# Patient Record
Sex: MALE | Race: WHITE | Employment: FULL TIME | URBAN - METROPOLITAN AREA
[De-identification: names, ages, dates, MRNs, and addresses within clinical notes are randomized per-mention and may not be internally consistent; named-entity substitution may affect disease eponyms.]

---

## 2017-03-09 ENCOUNTER — OFFICE VISIT (OUTPATIENT)
Dept: FAMILY MEDICINE | Facility: CLINIC | Age: 42
End: 2017-03-09
Payer: COMMERCIAL

## 2017-03-09 ENCOUNTER — RADIANT APPOINTMENT (OUTPATIENT)
Dept: GENERAL RADIOLOGY | Facility: CLINIC | Age: 42
End: 2017-03-09
Attending: NURSE PRACTITIONER
Payer: COMMERCIAL

## 2017-03-09 VITALS
BODY MASS INDEX: 27.36 KG/M2 | DIASTOLIC BLOOD PRESSURE: 100 MMHG | HEART RATE: 118 BPM | WEIGHT: 202 LBS | TEMPERATURE: 97.6 F | SYSTOLIC BLOOD PRESSURE: 142 MMHG | OXYGEN SATURATION: 98 % | HEIGHT: 72 IN

## 2017-03-09 DIAGNOSIS — J20.9 ACUTE BRONCHITIS, UNSPECIFIED ORGANISM: ICD-10-CM

## 2017-03-09 DIAGNOSIS — E10.3299 TYPE 1 DIABETES MELLITUS WITH MILD NONPROLIFERATIVE RETINOPATHY WITHOUT MACULAR EDEMA, UNSPECIFIED LATERALITY (H): Primary | ICD-10-CM

## 2017-03-09 LAB
ALBUMIN UR-MCNC: >=300 MG/DL
ANION GAP SERPL CALCULATED.3IONS-SCNC: 6 MMOL/L (ref 3–14)
APPEARANCE UR: CLEAR
BILIRUB UR QL STRIP: NEGATIVE
BUN SERPL-MCNC: 19 MG/DL (ref 7–30)
CALCIUM SERPL-MCNC: 8.9 MG/DL (ref 8.5–10.1)
CHLORIDE SERPL-SCNC: 101 MMOL/L (ref 94–109)
CO2 SERPL-SCNC: 29 MMOL/L (ref 20–32)
COLOR UR AUTO: YELLOW
CREAT SERPL-MCNC: 1.17 MG/DL (ref 0.66–1.25)
FEF 25/75: NORMAL
FEV-1: NORMAL
FEV1/FVC: NORMAL
FVC: NORMAL
GFR SERPL CREATININE-BSD FRML MDRD: 68 ML/MIN/1.7M2
GLUCOSE SERPL-MCNC: 289 MG/DL (ref 70–99)
GLUCOSE UR STRIP-MCNC: 500 MG/DL
HGB UR QL STRIP: ABNORMAL
HYALINE CASTS #/AREA URNS LPF: NORMAL /LPF (ref 0–2)
KETONES UR STRIP-MCNC: NEGATIVE MG/DL
LEUKOCYTE ESTERASE UR QL STRIP: NEGATIVE
NITRATE UR QL: NEGATIVE
PH UR STRIP: 6 PH (ref 5–7)
POTASSIUM SERPL-SCNC: 4.2 MMOL/L (ref 3.4–5.3)
RBC #/AREA URNS AUTO: NORMAL /HPF (ref 0–2)
SODIUM SERPL-SCNC: 136 MMOL/L (ref 133–144)
SP GR UR STRIP: 1.02 (ref 1–1.03)
URN SPEC COLLECT METH UR: ABNORMAL
UROBILINOGEN UR STRIP-ACNC: 0.2 EU/DL (ref 0.2–1)
WBC #/AREA URNS AUTO: NORMAL /HPF (ref 0–2)

## 2017-03-09 PROCEDURE — 94640 AIRWAY INHALATION TREATMENT: CPT | Performed by: NURSE PRACTITIONER

## 2017-03-09 PROCEDURE — 81001 URINALYSIS AUTO W/SCOPE: CPT | Performed by: NURSE PRACTITIONER

## 2017-03-09 PROCEDURE — 99214 OFFICE O/P EST MOD 30 MIN: CPT | Mod: 25 | Performed by: NURSE PRACTITIONER

## 2017-03-09 PROCEDURE — 71020 XR CHEST 2 VW: CPT

## 2017-03-09 PROCEDURE — 36415 COLL VENOUS BLD VENIPUNCTURE: CPT | Performed by: NURSE PRACTITIONER

## 2017-03-09 PROCEDURE — 80048 BASIC METABOLIC PNL TOTAL CA: CPT | Performed by: NURSE PRACTITIONER

## 2017-03-09 RX ORDER — ALBUTEROL SULFATE 90 UG/1
2 AEROSOL, METERED RESPIRATORY (INHALATION) EVERY 6 HOURS PRN
Qty: 1 INHALER | Refills: 0 | Status: SHIPPED | OUTPATIENT
Start: 2017-03-09

## 2017-03-09 RX ORDER — AZITHROMYCIN 250 MG/1
TABLET, FILM COATED ORAL
Qty: 6 TABLET | Refills: 0 | Status: SHIPPED | OUTPATIENT
Start: 2017-03-09 | End: 2018-12-17

## 2017-03-09 NOTE — MR AVS SNAPSHOT
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  Normal or non-critical lab and imaging results will be communicated to you by [employer], letter or phone within 4 business days after the clinic has received the results. If you do not hear from us within 7 days, please contact the clinic through [employer] or phone. If you have a critical or abnormal lab result, we will notify you by phone as soon as possible.  Submit refill requests through [employer] or call your pharmacy and they will forward the refill request to us. Please allow 3 business days for your refill to be completed.          Additional Information About Your Visit        [employer] Information     [employer] lets you send messages to your doctor, view your test results, renew your prescriptions, schedule appointments and more. To sign up, go to [email or web address] . Click on \"Log in\" on the left side of the screen, which will take you to the Welcome page. Then click on \"Sign up Now\" on the right side of the page.     You will be asked to enter the access code listed below, as well as some personal information. Please follow the directions to create your username and password.     Your access code " is: [email or web address]  Expires: 2017 11:52 AM     Your access code will  in 90 days. If you need help or a new code, please call your [facility] clinic or [phone].        Care EveryWhere ID     This is your Care EveryWhere ID. This could be used by other organizations to access your [facility] medical records  [id number]        Your Vitals Were     Pulse Temperature Height Pulse Oximetry BMI (Body Mass Index)       118 97.6  F (36.4  C) 6' (1.829 m) 98% 27.4 kg/m2        Blood Pressure from Last 3 Encounters:   17 (!) 142/100   16 135/73   14 102/60    Weight from Last 3 Encounters:   17 202 lb (91.6 kg)   16 202 lb (91.6 kg)   14 214 lb (97.1 kg)              We Performed the Following     *UA reflex to Microscopic and Culture ([facility] (except [place] and [place])     ALBUTEROL/IPRATROPIUM 3ML NEB     Basic metabolic panel  (Ca, Cl, CO2, Creat, Gluc, K, Na, BUN)     INHALATION/NEBULIZER TREATMENT, INITIAL     Urine Microscopic          Today's Medication Changes          These changes are accurate as of: 3/9/17 11:55 AM.  If you have any questions, ask your nurse or doctor.               Start taking these medicines.        Dose/Directions    albuterol 108 (90 BASE) MCG/ACT Inhaler   Commonly known as:  PROAIR HFA/PROVENTIL HFA/VENTOLIN HFA   Used for:  Acute bronchitis, unspecified organism   Started by:  Latisha Langford APRN CNP        Dose:  2 puff   Inhale 2 puffs into the lungs every 6 hours as needed for shortness of breath / dyspnea or wheezing   Quantity:  1 Inhaler   Refills:  0         These medicines have changed or have updated prescriptions.        Dose/Directions    * azithromycin 250 MG tablet   Commonly known as:  ZITHROMAX   This may have changed:  Another medication with the same name was added. Make sure you understand how and when to take each.   Used for:  Pneumonia due to infectious organism, unspecified  laterality, unspecified part of lung   Changed by:  Jessenia Duong APRN CNP        Two tablets first day, then one tablet daily for four days.   Quantity:  6 tablet   Refills:  0       * azithromycin 250 MG tablet   Commonly known as:  ZITHROMAX   This may have changed:  You were already taking a medication with the same name, and this prescription was added. Make sure you understand how and when to take each.   Used for:  Acute bronchitis, unspecified organism   Changed by:  Latisha Langford APRN CNP        Two tablets first day, then one tablet daily for four days.   Quantity:  6 tablet   Refills:  0       * Notice:  This list has 2 medication(s) that are the same as other medications prescribed for you. Read the directions carefully, and ask your doctor or other care provider to review them with you.         Where to get your medicines      These medications were sent to [facility] [id number] [place] [address] [address] AT [facility] [address] [facility] [address]     Phone:  [phone]     albuterol 108 (90 BASE) MCG/ACT Inhaler    azithromycin 250 MG tablet                Primary Care Provider Office Phone # Fax #    Paulo Alfred [phone]       [facility] [address] [facility] [address]  [facility] [address]        Thank you!     Thank you for choosing [facility]  for your care. Our goal is always to provide you with excellent care. Hearing back from our patients is one way we can continue to improve our services. Please take a few minutes to complete the written survey that you may receive in the mail after your visit with us. Thank you!             Your Updated Medication List - Protect others around you: Learn how to safely use, store and throw away your medicines at www.disposemymeds.org.          This list is accurate as of: 3/9/17 11:55 AM.  Always use your most recent med list.                   Brand Name  Dispense Instructions for use    albuterol 108 (90 BASE) MCG/ACT Inhaler    PROAIR HFA/PROVENTIL HFA/VENTOLIN HFA    1 Inhaler    Inhale 2 puffs into the lungs every 6 hours as needed for shortness of breath / dyspnea or wheezing       atorvastatin 20 MG tablet    LIPITOR     TK 1 T PO ONCE D       * azithromycin 250 MG tablet    ZITHROMAX    6 tablet    Two tablets first day, then one tablet daily for four days.       * azithromycin 250 MG tablet    ZITHROMAX    6 tablet    Two tablets first day, then one tablet daily for four days.       doxycycline 50 MG capsule    VIBRAMYCIN     TK 1 C PO BID       insulin aspart 100 UNITS/ML injection    NovoLOG     Inject Subcutaneous See Admin Instructions 3 units with each meal plus 1 unit for every 150 over 180 ~ 30 units per day.       insulin glargine 100 UNIT/ML injection    LANTUS     Inject 40 Units Subcutaneous At Bedtime.       lisinopril 10 MG tablet    PRINIVIL/ZESTRIL     TK TWO TS PO D       SB LOW DOSE ASA EC 81 MG EC tablet   Generic drug:  aspirin      Take 81 mg by mouth daily.       * Notice:  This list has 2 medication(s) that are the same as other medications prescribed for you. Read the directions carefully, and ask your doctor or other care provider to review them with you.

## 2017-03-09 NOTE — LETTER
[facility]  [address]  [place] [address]  [phone]                                                                                               Date: 3/9/2017    Harlan Barrientos                                                                               [address] [facility] [address]              Dear Harlan,    The radiologist read xray as negative for pneumonia also  Enclosed is a copy of your results.      It was a pleasure to see you at your last appointment. If you have any questions, please feel free to call myself or my nurse at [phone].          Sincerely,    Latisha Langford NP/ Angelina MARQUIS, CMA  Results for orders placed or performed in visit on 03/09/17   XR Chest 2 Views    Narrative    XR CHEST 2 VW 3/9/2017 11:35 AM    COMPARISON: 9/21/2016    HISTORY: Acute bronchitis.      Impression    IMPRESSION: Cardiac silhouette and pulmonary vasculature are within  normal limits. No focal airspace disease, pleural effusion or  pneumothorax.    ALISIA OVIEDO

## 2017-03-09 NOTE — PROGRESS NOTES
SUBJECTIVE:                                                    Harlan Barrientos is a 41 year old male who presents to clinic today for the following health issues:      RESPIRATORY SYMPTOMS      Duration: started last Thursday with URI -coughing , wheezing, chest congestion    Description  cough, wheezing, fever, headache and fatigue/malaise    Severity: severe    Accompanying signs and symptoms: None    History (predisposing factors):  none    Precipitating or alleviating factors: child has pneumonia and so does wife    Therapies tried and outcome:  nyquil    Type 1 DM  Did not take his insulin this morning, did drink orange juice, feeling dehydrated     Members of household sick and has been on plane recently, did have flu vaccine    Problem list and histories reviewed & adjusted, as indicated.  Additional history: as documented    Patient Active Problem List   Diagnosis     ED (erectile dysfunction)     Hyperlipidemia     Microalbuminuria     HYPERLIPIDEMIA LDL GOAL <100     Type 1 diabetes, HbA1c goal < 8% (H)     Type 1 diabetes mellitus with mild nonproliferative diabetic retinopathy and without macular edema (H)     Past Surgical History   Procedure Laterality Date     C appendectomy       C anesth,dx arthrosc proc,shoulder joint  2008       Social History   Substance Use Topics     Smoking status: Former Smoker     Types: Cigarettes     Quit date: 1/1/2004     Smokeless tobacco: Never Used     Alcohol use Yes      Comment: 2-3 weekly     Family History   Problem Relation Age of Onset     OSTEOPOROSIS Mother      CANCER Maternal Grandmother      Brain Tumor     Neurologic Disorder Maternal Grandfather      ALS         Current Outpatient Prescriptions   Medication Sig Dispense Refill     doxycycline (VIBRAMYCIN) 50 MG capsule TK 1 C PO BID  0     lisinopril (PRINIVIL,ZESTRIL) 10 MG tablet TK TWO TS PO D  2     atorvastatin (LIPITOR) 20 MG tablet TK 1 T PO ONCE D  1     aspirin (SB LOW DOSE ASA EC) 81 MG EC  tablet Take 81 mg by mouth daily.        INSULIN ASPART 100 UNIT/ML SC SOLN Inject Subcutaneous See Admin Instructions 3 units with each meal plus 1 unit for every 150 over 180 ~ 30 units per day.        insulin glargine (LANTUS) SOLN 100 UNIT/ML Inject 40 Units Subcutaneous At Bedtime.        azithromycin (ZITHROMAX) 250 MG tablet Two tablets first day, then one tablet daily for four days. (Patient not taking: Reported on 3/9/2017) 6 tablet 0     Allergies   Allergen Reactions     Penicillins        Reviewed and updated as needed this visit by clinical staff  Tobacco  Allergies  Meds  Soc Hx      Reviewed and updated as needed this visit by Provider         ROS:  Constitutional, HEENT, cardiovascular, pulmonary, gi and gu systems are negative, except as otherwise noted.    OBJECTIVE:                                                    BP (!) 142/100  Pulse 118  Temp 97.6  F (36.4  C)  Ht 6' (1.829 m)  Wt 202 lb (91.6 kg)  SpO2 98%  BMI 27.4 kg/m2  Body mass index is 27.4 kg/(m^2).  GENERAL: healthy, alert and no distress  EYES: Eyes grossly normal to inspection, PERRL and conjunctivae and sclerae normal  HENT: ear canals and TM's normal, nose and mouth without ulcers or lesions  NECK: no adenopathy, no asymmetry, masses, or scars and thyroid normal to palpation  RESP: lungs clear to auscultation - no rales, rhonchi or wheezes  CV: regular rate and rhythm, normal S1 S2, no S3 or S4, no murmur, click or rub, no peripheral edema and peripheral pulses strong  Results for orders placed or performed in visit on 03/09/17 (from the past 24 hour(s))   Basic metabolic panel  (Ca, Cl, CO2, Creat, Gluc, K, Na, BUN)   Result Value Ref Range    Sodium 136 133 - 144 mmol/L    Potassium 4.2 3.4 - 5.3 mmol/L    Chloride 101 94 - 109 mmol/L    Carbon Dioxide 29 20 - 32 mmol/L    Anion Gap 6 3 - 14 mmol/L    Glucose 289 (H) 70 - 99 mg/dL    Urea Nitrogen 19 7 - 30 mg/dL    Creatinine 1.17 0.66 - 1.25 mg/dL    GFR Estimate 68 >60  mL/min/1.7m2    GFR Estimate If Black 82 >60 mL/min/1.7m2    Calcium 8.9 8.5 - 10.1 mg/dL   *UA reflex to Microscopic and Culture ([facility] [place] (except [place] and [place])   Result Value Ref Range    Color Urine Yellow     Appearance Urine Clear     Glucose Urine 500 (A) NEG mg/dL    Bilirubin Urine Negative NEG    Ketones Urine Negative NEG mg/dL    Specific Gravity Urine 1.020 1.003 - 1.035    Blood Urine Trace (A) NEG    pH Urine 6.0 5.0 - 7.0 pH    Protein Albumin Urine >=300 (A) NEG mg/dL    Urobilinogen Urine 0.2 0.2 - 1.0 EU/dL    Nitrite Urine Negative NEG    Leukocyte Esterase Urine Negative NEG    Source Midstream Urine    Urine Microscopic   Result Value Ref Range    WBC Urine O - 2 0 - 2 /HPF    RBC Urine O - 2 0 - 2 /HPF    Hyaline Casts O - 2 0 - 2 /LPF   INHALATION/NEBULIZER TREATMENT, INITIAL   Result Value Ref Range    FVC      FEV-1      FEV1/FVC      FEF 25/75       Diagnostic Test Results: chest xray negative       ASSESSMENT/PLAN:                                                          ICD-10-CM    1. 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 Acute bronchitis, unspecified organism J20.9 XR Chest 2 Views     INHALATION/NEBULIZER TREATMENT, INITIAL     ALBUTEROL/IPRATROPIUM 3ML NEB     azithromycin (ZITHROMAX) 250 MG tablet     albuterol (PROAIR HFA/PROVENTIL HFA/VENTOLIN HFA) 108 (90 BASE) MCG/ACT Inhaler       Patient Instructions   Push fluids  Begin plain mucinex 1200mg twice a day  Monitor glucoses  Use the inhaler 2 puffs every 6 hours  Please take insulin when home  Begin increasing fluids  RTC prn     Latisha Langford, APRN [facility]

## 2017-03-09 NOTE — LETTER
[facility]  [address]  [place] [address]  [phone]                                                                                               Date: 3/9/2017    Harlan Barrientos                                                                               [address] [address]              Dear Harlan,    These are today's lab results, Harlan  Enclosed is a copy of your results.      It was a pleasure to see you at your last appointment. If you have any questions, please feel free to call myself or my nurse at [phone].          Sincerely,    Latisha Langford, MELISSA/ Angelina MARQUIS, CMA  Results for orders placed or performed in visit on 03/09/17   INHALATION/NEBULIZER TREATMENT, INITIAL   Result Value Ref Range    FVC      FEV-1      FEV1/FVC      FEF 25/75     Basic metabolic panel  (Ca, Cl, CO2, Creat, Gluc, K, Na, BUN)   Result Value Ref Range    Sodium 136 133 - 144 mmol/L    Potassium 4.2 3.4 - 5.3 mmol/L    Chloride 101 94 - 109 mmol/L    Carbon Dioxide 29 20 - 32 mmol/L    Anion Gap 6 3 - 14 mmol/L    Glucose 289 (H) 70 - 99 mg/dL    Urea Nitrogen 19 7 - 30 mg/dL    Creatinine 1.17 0.66 - 1.25 mg/dL    GFR Estimate 68 >60 mL/min/1.7m2    GFR Estimate If Black 82 >60 mL/min/1.7m2    Calcium 8.9 8.5 - 10.1 mg/dL   *UA reflex to Microscopic and Culture ([facility] (except [place] and [place])   Result Value Ref Range    Color Urine Yellow     Appearance Urine Clear     Glucose Urine 500 (A) NEG mg/dL    Bilirubin Urine Negative NEG    Ketones Urine Negative NEG mg/dL    Specific Gravity Urine 1.020 1.003 - 1.035    Blood Urine Trace (A) NEG    pH Urine 6.0 5.0 - 7.0 pH    Protein Albumin Urine >=300 (A) NEG mg/dL    Urobilinogen Urine 0.2 0.2 - 1.0 EU/dL    Nitrite Urine Negative NEG    Leukocyte Esterase Urine Negative NEG    Source Midstream Urine    Urine Microscopic   Result Value Ref Range    WBC Urine O - 2 0 - 2 /HPF    RBC  Urine O - 2 0 - 2 /HPF    Hyaline Casts O - 2 0 - 2 /LPF   '

## 2017-03-09 NOTE — PATIENT INSTRUCTIONS
Push fluids  Begin plain mucinex 1200mg twice a day  Monitor glucoses  Use the inhaler 2 puffs every 6 hours

## 2018-12-17 ENCOUNTER — OFFICE VISIT (OUTPATIENT)
Dept: FAMILY MEDICINE | Facility: CLINIC | Age: 43
End: 2018-12-17
Payer: COMMERCIAL

## 2018-12-17 VITALS
WEIGHT: 219 LBS | TEMPERATURE: 97.3 F | OXYGEN SATURATION: 98 % | DIASTOLIC BLOOD PRESSURE: 92 MMHG | HEIGHT: 72 IN | SYSTOLIC BLOOD PRESSURE: 153 MMHG | HEART RATE: 97 BPM | BODY MASS INDEX: 29.66 KG/M2

## 2018-12-17 DIAGNOSIS — J06.9 UPPER RESPIRATORY TRACT INFECTION, UNSPECIFIED TYPE: Primary | ICD-10-CM

## 2018-12-17 PROCEDURE — 99213 OFFICE O/P EST LOW 20 MIN: CPT | Performed by: INTERNAL MEDICINE

## 2018-12-17 RX ORDER — AZITHROMYCIN 250 MG/1
TABLET, FILM COATED ORAL
Qty: 6 TABLET | Refills: 1 | Status: SHIPPED | OUTPATIENT
Start: 2018-12-17 | End: 2018-12-17

## 2018-12-17 RX ORDER — AZITHROMYCIN 250 MG/1
TABLET, FILM COATED ORAL
Qty: 6 TABLET | Refills: 0 | Status: SHIPPED | OUTPATIENT
Start: 2018-12-17

## 2018-12-17 RX ORDER — CODEINE PHOSPHATE AND GUAIFENESIN 10; 100 MG/5ML; MG/5ML
1 SOLUTION ORAL 2 TIMES DAILY PRN
Qty: 120 ML | Refills: 0 | Status: SHIPPED | OUTPATIENT
Start: 2018-12-17

## 2018-12-17 ASSESSMENT — MIFFLIN-ST. JEOR: SCORE: 1916.38

## 2018-12-17 NOTE — PROGRESS NOTES
SUBJECTIVE:   Harlan Barrientos is a 43 year old male who presents to clinic today for the following health issues:      RESPIRATORY SYMPTOMS      Duration: 3 weeks    Description  nasal congestion, facial pain/pressure, cough, wheezing, headache, fatigue/malaise, hoarse voice, myalgias and conjunctival irritation    Severity: moderate    Accompanying signs and symptoms: None    History (predisposing factors):  none    Precipitating or alleviating factors: None    Therapies tried and outcome:  rest and fluids      Current Medications:     Current Outpatient Medications   Medication Sig Dispense Refill     albuterol (PROAIR HFA/PROVENTIL HFA/VENTOLIN HFA) 108 (90 BASE) MCG/ACT Inhaler Inhale 2 puffs into the lungs every 6 hours as needed for shortness of breath / dyspnea or wheezing 1 Inhaler 0     aspirin (SB LOW DOSE ASA EC) 81 MG EC tablet Take 81 mg by mouth daily.        atorvastatin (LIPITOR) 20 MG tablet TK 1 T PO ONCE D  1     azithromycin (ZITHROMAX) 250 MG tablet Two tablets first day, then one tablet daily for four days. 6 tablet 0     guaiFENesin-codeine (ROBITUSSIN AC) 100-10 MG/5ML solution Take 5 mLs by mouth 2 times daily as needed for cough 120 mL 0     INSULIN ASPART 100 UNIT/ML SC SOLN Inject Subcutaneous See Admin Instructions 3 units with each meal plus 1 unit for every 150 over 180 ~ 30 units per day.        insulin glargine (LANTUS) SOLN 100 UNIT/ML Inject 40 Units Subcutaneous At Bedtime.        lisinopril (PRINIVIL,ZESTRIL) 10 MG tablet TK TWO TS PO D  2         Allergies:      Allergies   Allergen Reactions     Penicillins             Past Medical History:     Past Medical History:   Diagnosis Date     Acne     scalp     DM type 1 (diabetes mellitus, type 1) (H)     Since age 1 - Dr. Alejandra     ED (erectile dysfunction)      Hyperlipidaemia      Iron (Fe) deficiency anemia     Resolved. Negative GI workup at [facility].     Microalbuminuria      Retinopathies, diabetic     laser surgery          Past Surgical History:     Past Surgical History:   Procedure Laterality Date     C ANESTH,DX ARTHROSC PROC,SHOULDER JOINT  2008     C APPENDECTOMY           Family Medical History:     Family History   Problem Relation Age of Onset     Osteoporosis Mother      Cancer Maternal Grandmother         Brain Tumor     Neurologic Disorder Maternal Grandfather         ALS         Social History:     Social History     Socioeconomic History     Marital status: Single     Spouse name: Not on file     Number of children: 0     Years of education: Not on file     Highest education level: Not on file   Social Needs     Financial resource strain: Not on file     Food insecurity - worry: Not on file     Food insecurity - inability: Not on file     Transportation needs - medical: Not on file     Transportation needs - non-medical: Not on file   Occupational History     Occupation:    Tobacco Use     Smoking status: Former Smoker     Types: Cigarettes     Last attempt to quit: 2004     Years since quittin.9     Smokeless tobacco: Never Used   Substance and Sexual Activity     Alcohol use: Yes     Comment: 2-3 weekly     Drug use: Yes     Comment: marijuanna in the past not recently     Sexual activity: Yes     Partners: Female   Other Topics Concern     Not on file   Social History Narrative    , 1 child     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           Review of System:     Constitutional: Negative for fever or chills  Skin: Negative for rashes  Ears/Nose/Throat: Positive for nasal congestion, sore throat  Respiratory: Positive for cough  Cardiovascular: Negative for chest pain  Gastrointestinal: Negative for nausea, vomiting  Genitourinary: Negative for dysuria, hematuria  Musculoskeletal: Negative for myalgias  Neurologic: Negative for headaches  Psychiatric: Negative for depression, anxiety  Hematologic/Lymphatic/Immunologic: Negative  Endocrine: Negative  Behavioral: Negative for tobacco  use       Physical Exam:   BP (!) 153/92 (BP Location: Left arm, Patient Position: Sitting, Cuff Size: Adult Large)   Pulse 97   Temp 97.3  F (36.3  C) (Oral)   Ht 1.829 m (6')   Wt 99.3 kg (219 lb)   SpO2 98%   BMI 29.70 kg/m      GENERAL: alert and no distress  EYES: eyes grossly normal to inspection, and conjunctivae and sclerae normal  HENT: Normocephalic atraumatic. Nose and mouth without ulcers or lesions, nasal congestion present  NECK: supple  RESP: Intermittent dry coughing spells present  CV: regular rate and rhythm, normal S1 S2  LYMPH: no peripheral edema   ABDOMEN: nondistended  MS: no gross musculoskeletal defects noted  SKIN: no suspicious lesions or rashes  NEURO: Alert & Oriented x 3.   PSYCH: mentation appears normal, affect normal        Diagnostic Test Results:     Diagnostic Test Results:  Results for orders placed or performed in visit on 03/09/17   XR Chest 2 Views    Narrative    XR CHEST 2 VW 3/9/2017 11:35 AM    COMPARISON: 9/21/2016    HISTORY: Acute bronchitis.      Impression    IMPRESSION: Cardiac silhouette and pulmonary vasculature are within  normal limits. No focal airspace disease, pleural effusion or  pneumothorax.    ALISIA OVIEDO       ASSESSMENT/PLAN:       (J06.9) Upper respiratory tract infection, unspecified type  (primary encounter diagnosis)  Comment: several days of new URI symptoms with cough  Plan: guaiFENesin-codeine (ROBITUSSIN AC) 100-10 MG/5ML solution,  azithromycin (ZITHROMAX) 250 MG table      Follow Up Plan:     Patient is instructed to return to Internal Medicine clinic for follow-up visit in 1 week.        Gely Garcia MD  Internal Medicine  [facility]

## 2020-03-06 ENCOUNTER — ALLIED HEALTH/NURSE VISIT (OUTPATIENT)
Dept: NURSING | Facility: CLINIC | Age: 45
End: 2020-03-06
Payer: COMMERCIAL

## 2020-03-06 DIAGNOSIS — Z23 NEED FOR PROPHYLACTIC VACCINATION AND INOCULATION AGAINST INFLUENZA: Primary | ICD-10-CM

## 2020-03-06 PROCEDURE — 90732 PPSV23 VACC 2 YRS+ SUBQ/IM: CPT

## 2020-03-06 PROCEDURE — 90472 IMMUNIZATION ADMIN EACH ADD: CPT

## 2020-03-06 PROCEDURE — 90471 IMMUNIZATION ADMIN: CPT

## 2020-03-06 PROCEDURE — 90686 IIV4 VACC NO PRSV 0.5 ML IM: CPT

## 2020-03-06 PROCEDURE — 99207 ZZC NO CHARGE NURSE ONLY: CPT

## 2020-03-06 NOTE — NURSING NOTE
Prior to immunization administration, verified patients identity using patient s name and date of birth. Please see Immunization Activity for additional information.     Screening Questionnaire for Adult Immunization    Are you sick today?   No   Do you have allergies to medications, food, a vaccine component or latex?   Yes   Have you ever had a serious reaction after receiving a vaccination?   No   Do you have a long-term health problem with heart, lung, kidney, or metabolic disease (e.g., diabetes), asthma, a blood disorder, no spleen, complement component deficiency, a cochlear implant, or a spinal fluid leak?  Are you on long-term aspirin therapy?   Yes   Do you have cancer, leukemia, HIV/AIDS, or any other immune system problem?   No   Do you have a parent, brother, or sister with an immune system problem?   No   In the past 3 months, have you taken medications that affect  your immune system, such as prednisone, other steroids, or anticancer drugs; drugs for the treatment of rheumatoid arthritis, Crohn s disease, or psoriasis; or have you had radiation treatments?   No   Have you had a seizure, or a brain or other nervous system problem?   No   During the past year, have you received a transfusion of blood or blood    products, or been given immune (gamma) globulin or antiviral drug?   No   For women: Are you pregnant or is there a chance you could become       pregnant during the next month?   No   Have you received any vaccinations in the past 4 weeks?   No     Immunization questionnaire was positive for at least one answer.  Notified Dr. Alfred.        Per orders of Dr. Alfred, injection of Pneumovax 23 given by Ree Johnson. Patient instructed to remain in clinic for 15 minutes afterwards, and to report any adverse reaction to me immediately.       Screening performed by Ree Johnson on 3/6/2020 at 3:26 PM.